# Patient Record
Sex: FEMALE | Race: WHITE | Employment: OTHER | URBAN - METROPOLITAN AREA
[De-identification: names, ages, dates, MRNs, and addresses within clinical notes are randomized per-mention and may not be internally consistent; named-entity substitution may affect disease eponyms.]

---

## 2024-04-01 ENCOUNTER — APPOINTMENT (OUTPATIENT)
Dept: VASCULAR LAB | Age: 69
End: 2024-04-01
Payer: MEDICARE

## 2024-04-01 ENCOUNTER — HOSPITAL ENCOUNTER (EMERGENCY)
Age: 69
Discharge: HOME | End: 2024-04-01
Attending: EMERGENCY MEDICINE
Payer: MEDICARE

## 2024-04-01 VITALS
TEMPERATURE: 97.9 F | HEIGHT: 61 IN | WEIGHT: 166 LBS | OXYGEN SATURATION: 97 % | BODY MASS INDEX: 31.34 KG/M2 | DIASTOLIC BLOOD PRESSURE: 69 MMHG | HEART RATE: 70 BPM | SYSTOLIC BLOOD PRESSURE: 139 MMHG | RESPIRATION RATE: 18 BRPM

## 2024-04-01 DIAGNOSIS — M79.89 RIGHT LEG SWELLING: Primary | ICD-10-CM

## 2024-04-01 DIAGNOSIS — S80.11XA CONTUSION OF RIGHT LOWER EXTREMITY, INITIAL ENCOUNTER: ICD-10-CM

## 2024-04-01 LAB — ECHO BSA: 1.8 M2

## 2024-04-01 PROCEDURE — 99284 EMERGENCY DEPT VISIT MOD MDM: CPT

## 2024-04-01 PROCEDURE — 93971 EXTREMITY STUDY: CPT | Performed by: SURGERY

## 2024-04-01 PROCEDURE — 93971 EXTREMITY STUDY: CPT

## 2024-04-01 ASSESSMENT — ENCOUNTER SYMPTOMS
COLOR CHANGE: 1
BACK PAIN: 0
SHORTNESS OF BREATH: 0
COUGH: 0

## 2024-04-01 ASSESSMENT — PAIN - FUNCTIONAL ASSESSMENT: PAIN_FUNCTIONAL_ASSESSMENT: 0-10

## 2024-04-01 ASSESSMENT — PAIN SCALES - GENERAL: PAINLEVEL_OUTOF10: 8

## 2024-04-01 NOTE — ED PROVIDER NOTES
04/01/24 1026   BP:  139/69   Pulse: 70    Resp: 18    Temp: 97.9 °F (36.6 °C)    SpO2: 97%    Weight:  75.3 kg (166 lb)   Height:  1.549 m (5' 1\")         MEDICATIONS GIVEN IN THE ED:  Medications - No data to display      ED ORDERS:  Orders Placed This Encounter   Procedures    Vascular duplex lower extremity venous right     Standing Status:   Standing     Number of Occurrences:   1         CLINICAL DECISION MAKING:  The patient presented alert with a nontoxic appearance.    The patient was involved in his/her plan of care through shared decision making. The testing that was ordered was discussed with the patient. Any medications that may have been ordered were discussed with the patient.     I have reviewed the patient's previous medical records using the electronic health record that we have available that were pertinent to today's visit.      Imaging was reviewed and reported by the radiologist. Venous doppler was negative for DVT. Findings were discussed with the patient. Warm compresses were discussed. She has compression stockings at home that she is going to wear as directed. Follow up with pcp for a recheck, further evaluation and treatment. Evaluation and treatment course in the ED, and plan of care upon discharge was discussed in length with the patient. Patient had no further questions prior to being discharged and was instructed to return to the ED for new or worsening symptoms.        FINAL IMPRESSION      1. Right leg swelling    2. Contusion of right lower extremity, initial encounter            DISPOSITION/PLAN   DISPOSITION Decision To Discharge 04/01/2024 01:15:31 PM      PATIENT REFERRED TO:   Jami Castro, APRN - CNP  [address]  [facility] [address] [phone]    Schedule an appointment as soon as possible for a visit       [facility] ED  [facility] [address]  [phone]    If symptoms worsen, As needed      DISCHARGE MEDICATIONS:     New